# Patient Record
Sex: FEMALE | Race: OTHER | URBAN - METROPOLITAN AREA
[De-identification: names, ages, dates, MRNs, and addresses within clinical notes are randomized per-mention and may not be internally consistent; named-entity substitution may affect disease eponyms.]

---

## 2019-04-11 ENCOUNTER — EMERGENCY (EMERGENCY)
Facility: HOSPITAL | Age: 64
LOS: 1 days | Discharge: HOME | End: 2019-04-11
Admitting: EMERGENCY MEDICINE

## 2019-04-11 VITALS
HEIGHT: 64 IN | WEIGHT: 165.35 LBS | TEMPERATURE: 97 F | DIASTOLIC BLOOD PRESSURE: 81 MMHG | SYSTOLIC BLOOD PRESSURE: 157 MMHG | HEART RATE: 82 BPM | RESPIRATION RATE: 18 BRPM | OXYGEN SATURATION: 96 %

## 2019-04-11 NOTE — ED ADULT NURSE REASSESSMENT NOTE - NS ED NURSE REASSESS COMMENT FT1
Pt stated she did not want to wait to see provider. Pt advised to stay and have eye looked at. Pt left ED ambulatory with steady gait. No IV in place.

## 2019-04-11 NOTE — ED ADULT NURSE NOTE - CHPI ED NUR SYMPTOMS NEG
no fever/no nausea/no numbness/no loss of consciousness/no weakness/no syncope/no vomiting/no bleeding gums/no chills

## 2019-04-11 NOTE — ED ADULT TRIAGE NOTE - CHIEF COMPLAINT QUOTE
pt reports swelling and redness of the left upper eye since yesterday. + tearing noted. pt denies blurred vision.

## 2019-04-11 NOTE — ED ADULT NURSE NOTE - OBJECTIVE STATEMENT
Pt presents with L eye pain/swelling and redness. Pt reports pain started last night at 11:30pm. Pt has history of "ulcer in eye". Pt reports blurry vision, acuity 20/50 in L eye. Tearing noted. Pt took ibuprofen @ 0230 am with some relief.

## 2019-04-15 DIAGNOSIS — H57.89 OTHER SPECIFIED DISORDERS OF EYE AND ADNEXA: ICD-10-CM
